# Patient Record
Sex: FEMALE | Race: WHITE | NOT HISPANIC OR LATINO | URBAN - METROPOLITAN AREA
[De-identification: names, ages, dates, MRNs, and addresses within clinical notes are randomized per-mention and may not be internally consistent; named-entity substitution may affect disease eponyms.]

---

## 2018-04-12 PROBLEM — Z00.00 ENCOUNTER FOR PREVENTIVE HEALTH EXAMINATION: Status: ACTIVE | Noted: 2018-04-12

## 2018-04-14 ENCOUNTER — INPATIENT (INPATIENT)
Facility: HOSPITAL | Age: 38
LOS: 3 days | Discharge: HOME | End: 2018-04-18
Attending: NEUROLOGICAL SURGERY | Admitting: NEUROLOGICAL SURGERY
Payer: COMMERCIAL

## 2018-04-14 VITALS
HEART RATE: 95 BPM | OXYGEN SATURATION: 100 % | DIASTOLIC BLOOD PRESSURE: 77 MMHG | TEMPERATURE: 98 F | SYSTOLIC BLOOD PRESSURE: 130 MMHG | RESPIRATION RATE: 16 BRPM

## 2018-04-14 DIAGNOSIS — M50.20 OTHER CERVICAL DISC DISPLACEMENT, UNSPECIFIED CERVICAL REGION: ICD-10-CM

## 2018-04-14 LAB
APTT BLD: 26.3 SEC — LOW (ref 27.5–37.4)
BLD GP AB SCN SERPL QL: NEGATIVE — SIGNIFICANT CHANGE UP
BUN SERPL-MCNC: 13 MG/DL — SIGNIFICANT CHANGE UP (ref 7–23)
CALCIUM SERPL-MCNC: 9.3 MG/DL — SIGNIFICANT CHANGE UP (ref 8.4–10.5)
CHLORIDE SERPL-SCNC: 100 MMOL/L — SIGNIFICANT CHANGE UP (ref 98–107)
CO2 SERPL-SCNC: 23 MMOL/L — SIGNIFICANT CHANGE UP (ref 22–31)
CREAT SERPL-MCNC: 0.63 MG/DL — SIGNIFICANT CHANGE UP (ref 0.5–1.3)
GLUCOSE SERPL-MCNC: 80 MG/DL — SIGNIFICANT CHANGE UP (ref 70–99)
HCG SERPL-ACNC: < 5 MIU/ML — SIGNIFICANT CHANGE UP
HCT VFR BLD CALC: 43.1 % — SIGNIFICANT CHANGE UP (ref 34.5–45)
HGB BLD-MCNC: 13.7 G/DL — SIGNIFICANT CHANGE UP (ref 11.5–15.5)
INR BLD: 0.99 — SIGNIFICANT CHANGE UP (ref 0.88–1.17)
MCHC RBC-ENTMCNC: 29.7 PG — SIGNIFICANT CHANGE UP (ref 27–34)
MCHC RBC-ENTMCNC: 31.8 % — LOW (ref 32–36)
MCV RBC AUTO: 93.5 FL — SIGNIFICANT CHANGE UP (ref 80–100)
NRBC # FLD: 0 — SIGNIFICANT CHANGE UP
PLATELET # BLD AUTO: 191 K/UL — SIGNIFICANT CHANGE UP (ref 150–400)
PMV BLD: 10.6 FL — SIGNIFICANT CHANGE UP (ref 7–13)
POTASSIUM SERPL-MCNC: 4 MMOL/L — SIGNIFICANT CHANGE UP (ref 3.5–5.3)
POTASSIUM SERPL-SCNC: 4 MMOL/L — SIGNIFICANT CHANGE UP (ref 3.5–5.3)
PROTHROM AB SERPL-ACNC: 11 SEC — SIGNIFICANT CHANGE UP (ref 9.8–13.1)
RBC # BLD: 4.61 M/UL — SIGNIFICANT CHANGE UP (ref 3.8–5.2)
RBC # FLD: 12.5 % — SIGNIFICANT CHANGE UP (ref 10.3–14.5)
RH IG SCN BLD-IMP: POSITIVE — SIGNIFICANT CHANGE UP
SODIUM SERPL-SCNC: 140 MMOL/L — SIGNIFICANT CHANGE UP (ref 135–145)
WBC # BLD: 6.35 K/UL — SIGNIFICANT CHANGE UP (ref 3.8–10.5)
WBC # FLD AUTO: 6.35 K/UL — SIGNIFICANT CHANGE UP (ref 3.8–10.5)

## 2018-04-14 PROCEDURE — 72141 MRI NECK SPINE W/O DYE: CPT | Mod: 26

## 2018-04-14 RX ORDER — DIAZEPAM 5 MG
2 TABLET ORAL EVERY 8 HOURS
Qty: 0 | Refills: 0 | Status: DISCONTINUED | OUTPATIENT
Start: 2018-04-14 | End: 2018-04-18

## 2018-04-14 RX ORDER — DEXAMETHASONE 0.5 MG/5ML
4 ELIXIR ORAL EVERY 6 HOURS
Qty: 0 | Refills: 0 | Status: DISCONTINUED | OUTPATIENT
Start: 2018-04-14 | End: 2018-04-18

## 2018-04-14 RX ORDER — LIDOCAINE 4 G/100G
1 CREAM TOPICAL ONCE
Qty: 0 | Refills: 0 | Status: COMPLETED | OUTPATIENT
Start: 2018-04-14 | End: 2018-04-14

## 2018-04-14 RX ORDER — ACETAMINOPHEN 500 MG
650 TABLET ORAL EVERY 6 HOURS
Qty: 0 | Refills: 0 | Status: DISCONTINUED | OUTPATIENT
Start: 2018-04-14 | End: 2018-04-18

## 2018-04-14 RX ORDER — KETOROLAC TROMETHAMINE 30 MG/ML
30 SYRINGE (ML) INJECTION ONCE
Qty: 0 | Refills: 0 | Status: DISCONTINUED | OUTPATIENT
Start: 2018-04-14 | End: 2018-04-14

## 2018-04-14 RX ORDER — ESCITALOPRAM OXALATE 10 MG/1
1 TABLET, FILM COATED ORAL
Qty: 0 | Refills: 0 | COMMUNITY

## 2018-04-14 RX ORDER — GABAPENTIN 400 MG/1
100 CAPSULE ORAL EVERY 8 HOURS
Qty: 0 | Refills: 0 | Status: DISCONTINUED | OUTPATIENT
Start: 2018-04-14 | End: 2018-04-18

## 2018-04-14 RX ORDER — ESCITALOPRAM OXALATE 10 MG/1
10 TABLET, FILM COATED ORAL DAILY
Qty: 0 | Refills: 0 | Status: DISCONTINUED | OUTPATIENT
Start: 2018-04-14 | End: 2018-04-18

## 2018-04-14 RX ADMIN — Medication 4 MILLIGRAM(S): at 16:34

## 2018-04-14 RX ADMIN — Medication 650 MILLIGRAM(S): at 19:04

## 2018-04-14 RX ADMIN — Medication 650 MILLIGRAM(S): at 19:36

## 2018-04-14 RX ADMIN — Medication 2 MILLIGRAM(S): at 23:12

## 2018-04-14 RX ADMIN — LIDOCAINE 1 PATCH: 4 CREAM TOPICAL at 09:55

## 2018-04-14 RX ADMIN — GABAPENTIN 100 MILLIGRAM(S): 400 CAPSULE ORAL at 23:12

## 2018-04-14 RX ADMIN — Medication 30 MILLIGRAM(S): at 09:55

## 2018-04-14 RX ADMIN — ESCITALOPRAM OXALATE 10 MILLIGRAM(S): 10 TABLET, FILM COATED ORAL at 16:34

## 2018-04-14 RX ADMIN — GABAPENTIN 100 MILLIGRAM(S): 400 CAPSULE ORAL at 16:34

## 2018-04-14 RX ADMIN — Medication 30 MILLIGRAM(S): at 10:25

## 2018-04-14 NOTE — H&P ADULT - NSHPPHYSICALEXAM_GEN_ALL_CORE
awake, alert Ox3 PERRL EOMI FC WONG x4   Biceps/triceps 4+/5,  slightly decreased 5-/5  No clonus No hoffmans

## 2018-04-14 NOTE — H&P ADULT - HISTORY OF PRESENT ILLNESS
38 yr old female with hx of irritability presented with L arm pain and weakness since Sunday. Per patient she had some numbness and tingling in the L hand and arm which progressed on Thursday to include weakness in her L hand and isolated to the 1st 3 digits on her hand. She does report it has been hard to  objects and that since Thursday the weakness has worsened. She had an appointment with Dr Caballero on Monday per her PMD but came to the hospital for burning pain in the L arm and worsened numbness.

## 2018-04-14 NOTE — ED PROVIDER NOTE - MEDICAL DECISION MAKING DETAILS
38 year old female with left sided back pain. Will give muscle relaxants, pain medication and attempt to get an MRI in the ER secondary to neurologic deficits.

## 2018-04-14 NOTE — ED PROVIDER NOTE - OBJECTIVE STATEMENT
38 year old female with a PMHx of asthma and chronic back pain, presents to the ED with left sided back pain x 5 days. Her pain started on Monday but worsened on Thursday. She admits to pain in the left chest that radiates to her left UE. The patient states that this pain is similar to her previous back pain experiences. She presents with weakness in her LUE with a severe electric sensation that she feels is an 8/10 in severity. She feels this sensation in digits 1-3. The patient denies trauma or injury, fever, chills, nausea, vomiting, HA, SOB, constipation, diarrhea, urinary incontinence, numbness, tingling, or any other complaint. The patient was seen as [facility] recently and was given muscle relaxants and pain medication with a recommendation to follow up with neurosurgery. She has a follow up appointment with neurosurgery on Monday, but her pain was too great which prompted her visit today.        muscle pain meds attempt to get MRI secondary to neurplogic deficits 38 year old female with a PMHx of asthma and chronic back pain, presents to the ED with left sided back pain x 5 days. Her pain started on Monday but worsened on Thursday. She admits to pain in the left chest that radiates to her left UE. The patient states that this pain is similar to her previous back pain experiences. She presents with weakness in her LUE with a severe electric sensation that she feels is an 8/10 in severity. She feels this sensation in digits 1-3. The patient denies trauma or injury, fever, chills, nausea, vomiting, HA, SOB, constipation, diarrhea, urinary incontinence, numbness, tingling, or any other complaint. The patient was seen as [facility] recently and was given muscle relaxants and pain medication with a recommendation to follow up with neurosurgery. She has a follow up appointment with neurosurgery on Monday, but her pain was too great which prompted her visit today.

## 2018-04-14 NOTE — H&P ADULT - PROBLEM SELECTOR PLAN 1
Admit to Neurosurgery  medical clearance   Decadron 4q6 IV   OOB, ambulation   Pre op labs   D/w Dr Caballero

## 2018-04-14 NOTE — ED PROVIDER NOTE - UPPER EXTREMITY EXAM, LEFT
REDUCED STRENGTH/weakness in left UE with  and flexion, pain with passive and active movements in left UE

## 2018-04-14 NOTE — H&P ADULT - NSHPLABSRESULTS_GEN_ALL_CORE
< from: MR Cervical Spine No Cont (04.14.18 @ 12:00) >    C6-7: There is evidence of a left-sided disc herniation identified. This   causes effacement of the ventral thecal sac and ventral spinal cord.   Hypertrophic facet joint changes are seen on the left side. Mild to   moderate narrowing of the left spinal canal and moderate narrowing of the   left neural foramen. Mild narrowing right neural foramen.    C7-T1: Normal.    Impression: Degenerative changes as described above.    Left-sided disc herniation is seen at the C6-7 level.    < end of copied text >

## 2018-04-15 ENCOUNTER — TRANSCRIPTION ENCOUNTER (OUTPATIENT)
Age: 38
End: 2018-04-15

## 2018-04-15 DIAGNOSIS — M48.02 SPINAL STENOSIS, CERVICAL REGION: ICD-10-CM

## 2018-04-15 PROCEDURE — 99232 SBSQ HOSP IP/OBS MODERATE 35: CPT

## 2018-04-15 RX ORDER — OXYCODONE HYDROCHLORIDE 5 MG/1
10 TABLET ORAL EVERY 4 HOURS
Qty: 0 | Refills: 0 | Status: DISCONTINUED | OUTPATIENT
Start: 2018-04-15 | End: 2018-04-16

## 2018-04-15 RX ORDER — OXYCODONE HYDROCHLORIDE 5 MG/1
5 TABLET ORAL EVERY 4 HOURS
Qty: 0 | Refills: 0 | Status: DISCONTINUED | OUTPATIENT
Start: 2018-04-15 | End: 2018-04-18

## 2018-04-15 RX ADMIN — Medication 4 MILLIGRAM(S): at 12:12

## 2018-04-15 RX ADMIN — GABAPENTIN 100 MILLIGRAM(S): 400 CAPSULE ORAL at 13:11

## 2018-04-15 RX ADMIN — Medication 4 MILLIGRAM(S): at 19:01

## 2018-04-15 RX ADMIN — GABAPENTIN 100 MILLIGRAM(S): 400 CAPSULE ORAL at 06:37

## 2018-04-15 RX ADMIN — OXYCODONE HYDROCHLORIDE 10 MILLIGRAM(S): 5 TABLET ORAL at 05:57

## 2018-04-15 RX ADMIN — GABAPENTIN 100 MILLIGRAM(S): 400 CAPSULE ORAL at 21:12

## 2018-04-15 RX ADMIN — OXYCODONE HYDROCHLORIDE 10 MILLIGRAM(S): 5 TABLET ORAL at 06:40

## 2018-04-15 RX ADMIN — ESCITALOPRAM OXALATE 10 MILLIGRAM(S): 10 TABLET, FILM COATED ORAL at 12:12

## 2018-04-15 RX ADMIN — OXYCODONE HYDROCHLORIDE 5 MILLIGRAM(S): 5 TABLET ORAL at 14:02

## 2018-04-15 RX ADMIN — OXYCODONE HYDROCHLORIDE 5 MILLIGRAM(S): 5 TABLET ORAL at 13:10

## 2018-04-15 RX ADMIN — Medication 4 MILLIGRAM(S): at 00:38

## 2018-04-15 RX ADMIN — Medication 2 MILLIGRAM(S): at 20:06

## 2018-04-15 RX ADMIN — Medication 4 MILLIGRAM(S): at 06:37

## 2018-04-16 ENCOUNTER — APPOINTMENT (OUTPATIENT)
Dept: NEUROSURGERY | Facility: CLINIC | Age: 38
End: 2018-04-16

## 2018-04-16 ENCOUNTER — RESULT REVIEW (OUTPATIENT)
Age: 38
End: 2018-04-16

## 2018-04-16 DIAGNOSIS — F41.9 ANXIETY DISORDER, UNSPECIFIED: ICD-10-CM

## 2018-04-16 DIAGNOSIS — J45.909 UNSPECIFIED ASTHMA, UNCOMPLICATED: ICD-10-CM

## 2018-04-16 LAB
BASE EXCESS BLDA CALC-SCNC: 0.1 MMOL/L — SIGNIFICANT CHANGE UP
BASOPHILS # BLD AUTO: 0.01 K/UL — SIGNIFICANT CHANGE UP (ref 0–0.2)
BASOPHILS NFR BLD AUTO: 0.1 % — SIGNIFICANT CHANGE UP (ref 0–2)
BUN SERPL-MCNC: 9 MG/DL — SIGNIFICANT CHANGE UP (ref 7–23)
CA-I BLDA-SCNC: 1.17 MMOL/L — SIGNIFICANT CHANGE UP (ref 1.15–1.29)
CALCIUM SERPL-MCNC: 8.6 MG/DL — SIGNIFICANT CHANGE UP (ref 8.4–10.5)
CHLORIDE SERPL-SCNC: 102 MMOL/L — SIGNIFICANT CHANGE UP (ref 98–107)
CO2 SERPL-SCNC: 23 MMOL/L — SIGNIFICANT CHANGE UP (ref 22–31)
CREAT SERPL-MCNC: 0.57 MG/DL — SIGNIFICANT CHANGE UP (ref 0.5–1.3)
EOSINOPHIL # BLD AUTO: 0 K/UL — SIGNIFICANT CHANGE UP (ref 0–0.5)
EOSINOPHIL NFR BLD AUTO: 0 % — SIGNIFICANT CHANGE UP (ref 0–6)
GLUCOSE BLDA-MCNC: 117 MG/DL — HIGH (ref 70–99)
GLUCOSE SERPL-MCNC: 128 MG/DL — HIGH (ref 70–99)
HCO3 BLDA-SCNC: 25 MMOL/L — SIGNIFICANT CHANGE UP (ref 22–26)
HCT VFR BLD CALC: 38.6 % — SIGNIFICANT CHANGE UP (ref 34.5–45)
HCT VFR BLDA CALC: 38.8 % — SIGNIFICANT CHANGE UP (ref 34.5–46.5)
HGB BLD-MCNC: 12.5 G/DL — SIGNIFICANT CHANGE UP (ref 11.5–15.5)
HGB BLDA-MCNC: 12.6 G/DL — SIGNIFICANT CHANGE UP (ref 11.5–15.5)
IMM GRANULOCYTES # BLD AUTO: 0.05 # — SIGNIFICANT CHANGE UP
IMM GRANULOCYTES NFR BLD AUTO: 0.4 % — SIGNIFICANT CHANGE UP (ref 0–1.5)
LYMPHOCYTES # BLD AUTO: 0.4 K/UL — LOW (ref 1–3.3)
LYMPHOCYTES # BLD AUTO: 3.3 % — LOW (ref 13–44)
MCHC RBC-ENTMCNC: 30 PG — SIGNIFICANT CHANGE UP (ref 27–34)
MCHC RBC-ENTMCNC: 32.4 % — SIGNIFICANT CHANGE UP (ref 32–36)
MCV RBC AUTO: 92.6 FL — SIGNIFICANT CHANGE UP (ref 80–100)
MONOCYTES # BLD AUTO: 0.59 K/UL — SIGNIFICANT CHANGE UP (ref 0–0.9)
MONOCYTES NFR BLD AUTO: 4.9 % — SIGNIFICANT CHANGE UP (ref 2–14)
NEUTROPHILS # BLD AUTO: 10.99 K/UL — HIGH (ref 1.8–7.4)
NEUTROPHILS NFR BLD AUTO: 91.3 % — HIGH (ref 43–77)
NRBC # FLD: 0 — SIGNIFICANT CHANGE UP
PCO2 BLDA: 34 MMHG — SIGNIFICANT CHANGE UP (ref 32–48)
PH BLDA: 7.46 PH — HIGH (ref 7.35–7.45)
PLATELET # BLD AUTO: 204 K/UL — SIGNIFICANT CHANGE UP (ref 150–400)
PMV BLD: 10.2 FL — SIGNIFICANT CHANGE UP (ref 7–13)
PO2 BLDA: 218 MMHG — HIGH (ref 83–108)
POTASSIUM BLDA-SCNC: 3.6 MMOL/L — SIGNIFICANT CHANGE UP (ref 3.4–4.5)
POTASSIUM SERPL-MCNC: 4 MMOL/L — SIGNIFICANT CHANGE UP (ref 3.5–5.3)
POTASSIUM SERPL-SCNC: 4 MMOL/L — SIGNIFICANT CHANGE UP (ref 3.5–5.3)
RBC # BLD: 4.17 M/UL — SIGNIFICANT CHANGE UP (ref 3.8–5.2)
RBC # FLD: 12.7 % — SIGNIFICANT CHANGE UP (ref 10.3–14.5)
RH IG SCN BLD-IMP: POSITIVE — SIGNIFICANT CHANGE UP
SAO2 % BLDA: 99.5 % — HIGH (ref 95–99)
SODIUM BLDA-SCNC: 137 MMOL/L — SIGNIFICANT CHANGE UP (ref 136–146)
SODIUM SERPL-SCNC: 138 MMOL/L — SIGNIFICANT CHANGE UP (ref 135–145)
WBC # BLD: 12.04 K/UL — HIGH (ref 3.8–10.5)
WBC # FLD AUTO: 12.04 K/UL — HIGH (ref 3.8–10.5)

## 2018-04-16 PROCEDURE — 99223 1ST HOSP IP/OBS HIGH 75: CPT

## 2018-04-16 PROCEDURE — 88304 TISSUE EXAM BY PATHOLOGIST: CPT | Mod: 26

## 2018-04-16 PROCEDURE — 72125 CT NECK SPINE W/O DYE: CPT | Mod: 26

## 2018-04-16 PROCEDURE — 22853 INSJ BIOMECHANICAL DEVICE: CPT

## 2018-04-16 PROCEDURE — 22551 ARTHRD ANT NTRBDY CERVICAL: CPT

## 2018-04-16 RX ORDER — ALBUTEROL 90 UG/1
2 AEROSOL, METERED ORAL EVERY 6 HOURS
Qty: 0 | Refills: 0 | Status: DISCONTINUED | OUTPATIENT
Start: 2018-04-16 | End: 2018-04-18

## 2018-04-16 RX ORDER — DOCUSATE SODIUM 100 MG
100 CAPSULE ORAL THREE TIMES A DAY
Qty: 0 | Refills: 0 | Status: DISCONTINUED | OUTPATIENT
Start: 2018-04-16 | End: 2018-04-18

## 2018-04-16 RX ORDER — FENTANYL CITRATE 50 UG/ML
25 INJECTION INTRAVENOUS
Qty: 0 | Refills: 0 | Status: DISCONTINUED | OUTPATIENT
Start: 2018-04-16 | End: 2018-04-17

## 2018-04-16 RX ORDER — SODIUM CHLORIDE 9 MG/ML
1000 INJECTION INTRAMUSCULAR; INTRAVENOUS; SUBCUTANEOUS
Qty: 0 | Refills: 0 | Status: DISCONTINUED | OUTPATIENT
Start: 2018-04-16 | End: 2018-04-17

## 2018-04-16 RX ORDER — LACTOBACILLUS ACIDOPHILUS 100MM CELL
1 CAPSULE ORAL DAILY
Qty: 0 | Refills: 0 | Status: DISCONTINUED | OUTPATIENT
Start: 2018-04-16 | End: 2018-04-18

## 2018-04-16 RX ORDER — CEFAZOLIN SODIUM 1 G
1000 VIAL (EA) INJECTION EVERY 8 HOURS
Qty: 0 | Refills: 0 | Status: COMPLETED | OUTPATIENT
Start: 2018-04-16 | End: 2018-04-16

## 2018-04-16 RX ORDER — SENNA PLUS 8.6 MG/1
2 TABLET ORAL AT BEDTIME
Qty: 0 | Refills: 0 | Status: DISCONTINUED | OUTPATIENT
Start: 2018-04-16 | End: 2018-04-18

## 2018-04-16 RX ORDER — ONDANSETRON 8 MG/1
4 TABLET, FILM COATED ORAL ONCE
Qty: 0 | Refills: 0 | Status: DISCONTINUED | OUTPATIENT
Start: 2018-04-16 | End: 2018-04-17

## 2018-04-16 RX ORDER — HYDROMORPHONE HYDROCHLORIDE 2 MG/ML
0.5 INJECTION INTRAMUSCULAR; INTRAVENOUS; SUBCUTANEOUS
Qty: 0 | Refills: 0 | Status: DISCONTINUED | OUTPATIENT
Start: 2018-04-16 | End: 2018-04-17

## 2018-04-16 RX ORDER — PANTOPRAZOLE SODIUM 20 MG/1
40 TABLET, DELAYED RELEASE ORAL
Qty: 0 | Refills: 0 | Status: DISCONTINUED | OUTPATIENT
Start: 2018-04-16 | End: 2018-04-18

## 2018-04-16 RX ORDER — HYDROMORPHONE HYDROCHLORIDE 2 MG/ML
1 INJECTION INTRAMUSCULAR; INTRAVENOUS; SUBCUTANEOUS EVERY 4 HOURS
Qty: 0 | Refills: 0 | Status: DISCONTINUED | OUTPATIENT
Start: 2018-04-16 | End: 2018-04-18

## 2018-04-16 RX ADMIN — SODIUM CHLORIDE 75 MILLILITER(S): 9 INJECTION INTRAMUSCULAR; INTRAVENOUS; SUBCUTANEOUS at 17:15

## 2018-04-16 RX ADMIN — HYDROMORPHONE HYDROCHLORIDE 0.5 MILLIGRAM(S): 2 INJECTION INTRAMUSCULAR; INTRAVENOUS; SUBCUTANEOUS at 22:32

## 2018-04-16 RX ADMIN — GABAPENTIN 100 MILLIGRAM(S): 400 CAPSULE ORAL at 21:35

## 2018-04-16 RX ADMIN — PANTOPRAZOLE SODIUM 40 MILLIGRAM(S): 20 TABLET, DELAYED RELEASE ORAL at 07:40

## 2018-04-16 RX ADMIN — Medication 4 MILLIGRAM(S): at 19:58

## 2018-04-16 RX ADMIN — OXYCODONE HYDROCHLORIDE 10 MILLIGRAM(S): 5 TABLET ORAL at 05:52

## 2018-04-16 RX ADMIN — SENNA PLUS 2 TABLET(S): 8.6 TABLET ORAL at 22:32

## 2018-04-16 RX ADMIN — ESCITALOPRAM OXALATE 10 MILLIGRAM(S): 10 TABLET, FILM COATED ORAL at 21:35

## 2018-04-16 RX ADMIN — HYDROMORPHONE HYDROCHLORIDE 1 MILLIGRAM(S): 2 INJECTION INTRAMUSCULAR; INTRAVENOUS; SUBCUTANEOUS at 18:05

## 2018-04-16 RX ADMIN — Medication 100 MILLIGRAM(S): at 21:33

## 2018-04-16 RX ADMIN — HYDROMORPHONE HYDROCHLORIDE 0.5 MILLIGRAM(S): 2 INJECTION INTRAMUSCULAR; INTRAVENOUS; SUBCUTANEOUS at 22:44

## 2018-04-16 RX ADMIN — OXYCODONE HYDROCHLORIDE 10 MILLIGRAM(S): 5 TABLET ORAL at 06:30

## 2018-04-16 RX ADMIN — GABAPENTIN 100 MILLIGRAM(S): 400 CAPSULE ORAL at 05:53

## 2018-04-16 RX ADMIN — Medication 4 MILLIGRAM(S): at 05:53

## 2018-04-16 RX ADMIN — HYDROMORPHONE HYDROCHLORIDE 1 MILLIGRAM(S): 2 INJECTION INTRAMUSCULAR; INTRAVENOUS; SUBCUTANEOUS at 17:48

## 2018-04-16 RX ADMIN — Medication 4 MILLIGRAM(S): at 00:25

## 2018-04-16 NOTE — CONSULT NOTE ADULT - PROBLEM SELECTOR RECOMMENDATION 9
as per NSx, plan for ACDF today   on Decadron   pt is low risk for this procedure, no further medical/cardiac workup needed

## 2018-04-16 NOTE — CONSULT NOTE ADULT - ASSESSMENT
37 yo F w/ "irritability", mild intermittent asthma p/w LUE weakness, fth C6-C7 disk herniation, plan for OR for ACDF today

## 2018-04-16 NOTE — CONSULT NOTE ADULT - SUBJECTIVE AND OBJECTIVE BOX
CHIEF COMPLAINT: Patient is a 38y old  Female who presents with a chief complaint of neck pain (14 Apr 2018 15:06)      HPI: 38 yr old female with hx of irritability presented with L arm pain and weakness since Sunday. Per patient she had some numbness and tingling in the L hand and arm which progressed on Thursday to include weakness in her L hand and isolated to the 1st 3 digits on her hand. She does report it has been hard to  objects and that since Thursday the weakness has worsened. She had an appointment with Dr Caballero on Monday per her PMD but came to the hospital for burning pain in the L arm and worsened numbness. (14 Apr 2018 15:06)      History limited due to: [ ] Dementia  [ ] Delirium  [ ] Condition    Pain Symptoms if applicable:             	                         none	   mild         moderate         severe  	                            0	    1-3	     4-6	         7-10  Pain:  Location:	  Modifying factors:	  Associated symptoms:	    Allergies    No Known Allergies    Intolerances        HOME MEDICATIONS: [x] Reviewed    MEDICATIONS  (STANDING):  dexamethasone  Injectable 4 milliGRAM(s) IV Push every 6 hours  docusate sodium 100 milliGRAM(s) Oral three times a day  escitalopram 10 milliGRAM(s) Oral daily  gabapentin 100 milliGRAM(s) Oral every 8 hours  pantoprazole    Tablet 40 milliGRAM(s) Oral before breakfast  senna 2 Tablet(s) Oral at bedtime    MEDICATIONS  (PRN):  acetaminophen   Tablet. 650 milliGRAM(s) Oral every 6 hours PRN Mild Pain (1 - 3)  diazepam    Tablet 2 milliGRAM(s) Oral every 8 hours PRN spasm  oxyCODONE    IR 5 milliGRAM(s) Oral every 4 hours PRN Moderate Pain (4 - 6)  oxyCODONE    IR 10 milliGRAM(s) Oral every 4 hours PRN Severe Pain (7 - 10)      PAST MEDICAL & SURGICAL HISTORY:  Chronic back pain  Asthma  No significant past surgical history  [x ] Reviewed     SOCIAL HISTORY:  [x] Substance abuse:   [x] Alcohol use:   [x] Tobacco:     FAMILY HISTORY:  [x] No pertinent family history in first degree relatives     REVIEW OF SYSTEMS:  [x] All other ROS negative  [  ] Unable to obtain due to poor mental status    Vital Signs Last 24 Hrs  T(C): 36.9 (16 Apr 2018 09:50), Max: 37.1 (15 Apr 2018 17:32)  T(F): 98.5 (16 Apr 2018 09:50), Max: 98.8 (15 Apr 2018 17:32)  HR: 87 (16 Apr 2018 09:50) (83 - 98)  BP: 139/83 (16 Apr 2018 09:50) (96/55 - 139/83)  BP(mean): --  RR: 18 (16 Apr 2018 09:50) (16 - 18)  SpO2: 95% (16 Apr 2018 09:50) (95% - 99%)    PHYSICAL EXAM:  GENERAL: NAD, well-groomed, well-developed  HEAD:  Atraumatic, Normocephalic  EYES: EOMI, PERRLA, conjunctiva and sclera clear  ENMT: Moist mucous membranes  NECK: Supple, No JVD  RESPIRATORY: Clear to auscultation bilaterally; No rales, rhonchi, wheezing, or rubs  CARDIOVASCULAR: Regular rate and rhythm; No murmurs, rubs, or gallops  GASTROINTESTINAL: Soft, Nontender, Nondistended; Bowel sounds present  GENITOURINARY: Not examined  EXTREMITIES:  2+ Peripheral Pulses, No clubbing, cyanosis, or edema  NERVOUS SYSTEM:  Alert & Oriented X3; Moving all 4 extremities; No gross sensory deficits  HEME/LYMPH: No lymphadenopathy noted  SKIN: No rashes or lesions; Incisions C/D/I    LABS:                        13.7   6.35  )-----------( 191      ( 14 Apr 2018 16:22 )             43.1     Hemoglobin: 13.7 g/dL (04-14 @ 16:22)    04-14    140  |  100  |  13  ----------------------------<  80  4.0   |  23  |  0.63    Ca    9.3      14 Apr 2018 16:22      PT/INR - ( 14 Apr 2018 16:22 )   PT: 11.0 SEC;   INR: 0.99          PTT - ( 14 Apr 2018 16:22 )  PTT:26.3 SEC    Microbiology     RADIOLOGY & ADDITIONAL STUDIES:    EKG:   Personally Reviewed:  [x] YES     Imaging:   Personally Reviewed:  [x] YES               [ ] Consultant(s) Notes Reviewed  [x] Care Discussed with Consultants/Other Providers: CHIEF COMPLAINT: Patient is a 38y old  Female who presents with a chief complaint of neck pain (14 Apr 2018 15:06)      HPI: 38 yr old female with hx of irritability presented with L arm pain and weakness since Sunday. Per patient she had some numbness and tingling in the L hand and arm which progressed on Thursday to include weakness in her L hand and isolated to the 1st 3 digits on her hand. She does report it has been hard to  objects and that since Thursday the weakness has worsened. She had an appointment with Dr Caballero on Monday per her PMD but came to the hospital for burning pain in the L arm and worsened numbness. (14 Apr 2018 15:06)    Interval hx: Pt seen at bedside ~ 10 am. She just met with Dr. Caballero and is pending surgery at 12:30PM. Pt reports that she is on Lexapro for "irritability." She also has hx of what asthma, mild intermittent, for which she uses albuterol inhaler infrequently, < 1x / month.   She denies any current cp, sob, palpitations, nausea, vomiting, abdominal pain, diarrhea, or constipation.       Allergies    No Known Allergies    Intolerances        HOME MEDICATIONS: [x] Reviewed    MEDICATIONS  (STANDING):  dexamethasone  Injectable 4 milliGRAM(s) IV Push every 6 hours  docusate sodium 100 milliGRAM(s) Oral three times a day  escitalopram 10 milliGRAM(s) Oral daily  gabapentin 100 milliGRAM(s) Oral every 8 hours  pantoprazole    Tablet 40 milliGRAM(s) Oral before breakfast  senna 2 Tablet(s) Oral at bedtime    MEDICATIONS  (PRN):  acetaminophen   Tablet. 650 milliGRAM(s) Oral every 6 hours PRN Mild Pain (1 - 3)  diazepam    Tablet 2 milliGRAM(s) Oral every 8 hours PRN spasm  oxyCODONE    IR 5 milliGRAM(s) Oral every 4 hours PRN Moderate Pain (4 - 6)  oxyCODONE    IR 10 milliGRAM(s) Oral every 4 hours PRN Severe Pain (7 - 10)      PAST MEDICAL & SURGICAL HISTORY:  Chronic back pain  Asthma  No significant past surgical history  [x ] Reviewed     SOCIAL HISTORY:   w/ kids   [x] Substance abuse: no   [x] Alcohol use: social   [x] Tobacco: remote    FAMILY HISTORY:  [x] No pertinent family history in first degree relatives     REVIEW OF SYSTEMS:  [x] All other ROS negative  [  ] Unable to obtain due to poor mental status    Vital Signs Last 24 Hrs  T(C): 36.9 (16 Apr 2018 09:50), Max: 37.1 (15 Apr 2018 17:32)  T(F): 98.5 (16 Apr 2018 09:50), Max: 98.8 (15 Apr 2018 17:32)  HR: 87 (16 Apr 2018 09:50) (83 - 98)  BP: 139/83 (16 Apr 2018 09:50) (96/55 - 139/83)  BP(mean): --  RR: 18 (16 Apr 2018 09:50) (16 - 18)  SpO2: 95% (16 Apr 2018 09:50) (95% - 99%)    PHYSICAL EXAM:  GENERAL: NAD, well-groomed, well-developed  HEAD:  Atraumatic, Normocephalic  EYES: EOMI, PERRLA, conjunctiva and sclera clear  ENMT: Moist mucous membranes  NECK: Supple, No JVD  RESPIRATORY: Clear to auscultation bilaterally; No rales, rhonchi, wheezing, or rubs  CARDIOVASCULAR: Regular rate and rhythm; No murmurs, rubs, or gallops  GASTROINTESTINAL: Soft, Nontender, Nondistended; Bowel sounds present  EXTREMITIES:  2+ Peripheral Pulses, No clubbing, cyanosis, or edema  NERVOUS SYSTEM:  Alert & Oriented X3; Moving all 4 extremities; No gross sensory deficits      LABS:                        13.7   6.35  )-----------( 191      ( 14 Apr 2018 16:22 )             43.1     Hemoglobin: 13.7 g/dL (04-14 @ 16:22)    04-14    140  |  100  |  13  ----------------------------<  80  4.0   |  23  |  0.63    Ca    9.3      14 Apr 2018 16:22      PT/INR - ( 14 Apr 2018 16:22 )   PT: 11.0 SEC;   INR: 0.99          PTT - ( 14 Apr 2018 16:22 )  PTT:26.3 SEC    Microbiology     RADIOLOGY & ADDITIONAL STUDIES:    EKG:   Personally Reviewed:  [x] YES   NSR @ 95 bpm     Imaging:   Personally Reviewed:  [x] YES   < from: MR Cervical Spine No Cont (04.14.18 @ 12:00) >    EXAM:  MR SPINE CERVICAL        PROCEDURE DATE:  Apr 14 2018         INTERPRETATION:  History: Neck pain.    MRI of the cervical spine was performed using sagittal T1-T2 and STIR   sequence. Axial T2 and 2-D merge sequences were performed.    Mild loss of normal cervical lordosis is seen.    Mild scoliosis is seen.    The vertebral body height alignment and marrow signal appear normal    Disc desiccation seen throughout the cervical spine region which is   secondary to degenerative changes been    C2-3: Normal    C3-4: Hypertrophic facet joint changes are seen on the right side which   causes mild to moderate narrowing of the right neural foramen.     C4-5: Hypertrophic facet joint changes are seen on the right side which   causes moderate narrowing of the right neural foramen.    C5-6: Hypertrophic change is seen on the right side as well as   hypertrophic changes involving the right uncovertebral joint. Moderate   narrowing of the right neural foramen is seen. Mild narrowing of the left   neural foramen is seen.    C6-7: There is evidence of a left-sided disc herniation identified. This   causes effacement of the ventral thecal sac and ventral spinal cord.   Hypertrophic facet joint changes are seen on the left side. Mild to   moderate narrowing of the left spinal canal and moderate narrowing of the   left neural foramen. Mild narrowing right neural foramen.    C7-T1: Normal.    Impression: Degenerative changes as described above.    Left-sided disc herniation is seen at the C6-7 level.                    ELIZABETH CORDON M.D., ATTENDING RADIOLOGIST  This document has been electronically signed. Apr 14 2018 12:13PM              < end of copied text >                [ ] Consultant(s) Notes Reviewed  [x] Care Discussed with Consultants/Other Providers: Gretta GRIJALVA re med optimization, plan for OR today

## 2018-04-17 PROCEDURE — 99233 SBSQ HOSP IP/OBS HIGH 50: CPT

## 2018-04-17 RX ADMIN — ESCITALOPRAM OXALATE 10 MILLIGRAM(S): 10 TABLET, FILM COATED ORAL at 13:33

## 2018-04-17 RX ADMIN — HYDROMORPHONE HYDROCHLORIDE 1 MILLIGRAM(S): 2 INJECTION INTRAMUSCULAR; INTRAVENOUS; SUBCUTANEOUS at 06:45

## 2018-04-17 RX ADMIN — HYDROMORPHONE HYDROCHLORIDE 1 MILLIGRAM(S): 2 INJECTION INTRAMUSCULAR; INTRAVENOUS; SUBCUTANEOUS at 07:15

## 2018-04-17 RX ADMIN — HYDROMORPHONE HYDROCHLORIDE 1 MILLIGRAM(S): 2 INJECTION INTRAMUSCULAR; INTRAVENOUS; SUBCUTANEOUS at 22:20

## 2018-04-17 RX ADMIN — Medication 100 MILLIGRAM(S): at 22:02

## 2018-04-17 RX ADMIN — HYDROMORPHONE HYDROCHLORIDE 1 MILLIGRAM(S): 2 INJECTION INTRAMUSCULAR; INTRAVENOUS; SUBCUTANEOUS at 02:25

## 2018-04-17 RX ADMIN — GABAPENTIN 100 MILLIGRAM(S): 400 CAPSULE ORAL at 06:45

## 2018-04-17 RX ADMIN — SENNA PLUS 2 TABLET(S): 8.6 TABLET ORAL at 22:02

## 2018-04-17 RX ADMIN — Medication 100 MILLIGRAM(S): at 07:36

## 2018-04-17 RX ADMIN — GABAPENTIN 100 MILLIGRAM(S): 400 CAPSULE ORAL at 13:33

## 2018-04-17 RX ADMIN — HYDROMORPHONE HYDROCHLORIDE 1 MILLIGRAM(S): 2 INJECTION INTRAMUSCULAR; INTRAVENOUS; SUBCUTANEOUS at 17:57

## 2018-04-17 RX ADMIN — Medication 100 MILLIGRAM(S): at 13:33

## 2018-04-17 RX ADMIN — HYDROMORPHONE HYDROCHLORIDE 1 MILLIGRAM(S): 2 INJECTION INTRAMUSCULAR; INTRAVENOUS; SUBCUTANEOUS at 13:47

## 2018-04-17 RX ADMIN — HYDROMORPHONE HYDROCHLORIDE 1 MILLIGRAM(S): 2 INJECTION INTRAMUSCULAR; INTRAVENOUS; SUBCUTANEOUS at 17:33

## 2018-04-17 RX ADMIN — GABAPENTIN 100 MILLIGRAM(S): 400 CAPSULE ORAL at 22:02

## 2018-04-17 RX ADMIN — HYDROMORPHONE HYDROCHLORIDE 1 MILLIGRAM(S): 2 INJECTION INTRAMUSCULAR; INTRAVENOUS; SUBCUTANEOUS at 22:02

## 2018-04-17 RX ADMIN — Medication 4 MILLIGRAM(S): at 07:36

## 2018-04-17 RX ADMIN — PANTOPRAZOLE SODIUM 40 MILLIGRAM(S): 20 TABLET, DELAYED RELEASE ORAL at 06:45

## 2018-04-17 RX ADMIN — Medication 1 TABLET(S): at 13:32

## 2018-04-17 RX ADMIN — HYDROMORPHONE HYDROCHLORIDE 1 MILLIGRAM(S): 2 INJECTION INTRAMUSCULAR; INTRAVENOUS; SUBCUTANEOUS at 13:32

## 2018-04-17 RX ADMIN — Medication 4 MILLIGRAM(S): at 19:02

## 2018-04-17 RX ADMIN — Medication 4 MILLIGRAM(S): at 02:24

## 2018-04-17 RX ADMIN — Medication 4 MILLIGRAM(S): at 13:33

## 2018-04-17 RX ADMIN — HYDROMORPHONE HYDROCHLORIDE 1 MILLIGRAM(S): 2 INJECTION INTRAMUSCULAR; INTRAVENOUS; SUBCUTANEOUS at 02:59

## 2018-04-17 NOTE — PHYSICAL THERAPY INITIAL EVALUATION ADULT - GENERAL OBSERVATIONS, REHAB EVAL
Consult received, chart reviewed. Patient received supine in bed, NAD, +harrison, +drain. Patient agreed to EVALUATION from Physical Therapist.

## 2018-04-17 NOTE — PHYSICAL THERAPY INITIAL EVALUATION ADULT - DISCHARGE DISPOSITION, PT EVAL
Home with Outpatient PT; Pt. presents without gross impairment and independent with functional mobility. Skilled, therapeutic PT intervention not indicated at this time. Pt. will be d/c from PT program at this time.

## 2018-04-17 NOTE — PHYSICAL THERAPY INITIAL EVALUATION ADULT - PERTINENT HX OF CURRENT PROBLEM, REHAB EVAL
Pt. presents with hx of irritability presented with L arm pain and weakness since Sunday Pt. presents with hx of irritability presented with L arm pain and weakness since Sunday, (+) CT cervical spine Left side disc herniation C6-7

## 2018-04-17 NOTE — OCCUPATIONAL THERAPY INITIAL EVALUATION ADULT - PERTINENT HX OF CURRENT PROBLEM, REHAB EVAL
38 yr old female with hx of irritability presented with L arm pain and weakn. Per patient she had some numbness and tingling in the L hand and arm which progressed to include weakness in her Left hand and isolated to the 1st 3 digits on her hand. She does report it has been hard to  objects. Pt now S/P C6-7 ACDF.

## 2018-04-17 NOTE — PHYSICAL THERAPY INITIAL EVALUATION ADULT - RANGE OF MOTION EXAMINATION, REHAB EVAL
bilateral upper extremity ROM was WFL (within functional limits)/bilateral lower extremity ROM was WFL (within functional limits)/UE kept ~90 degrees

## 2018-04-18 ENCOUNTER — TRANSCRIPTION ENCOUNTER (OUTPATIENT)
Age: 38
End: 2018-04-18

## 2018-04-18 VITALS — TEMPERATURE: 99 F

## 2018-04-18 PROBLEM — J45.909 UNSPECIFIED ASTHMA, UNCOMPLICATED: Chronic | Status: ACTIVE | Noted: 2018-04-14

## 2018-04-18 PROBLEM — M54.9 DORSALGIA, UNSPECIFIED: Chronic | Status: ACTIVE | Noted: 2018-04-14

## 2018-04-18 RX ORDER — DIAZEPAM 5 MG
1 TABLET ORAL
Qty: 15 | Refills: 0 | OUTPATIENT
Start: 2018-04-18 | End: 2018-04-22

## 2018-04-18 RX ORDER — OXYCODONE HYDROCHLORIDE 5 MG/1
1 TABLET ORAL
Qty: 42 | Refills: 0 | OUTPATIENT
Start: 2018-04-18 | End: 2018-04-24

## 2018-04-18 RX ORDER — SENNA PLUS 8.6 MG/1
2 TABLET ORAL
Qty: 0 | Refills: 0 | COMMUNITY
Start: 2018-04-18

## 2018-04-18 RX ORDER — GABAPENTIN 400 MG/1
1 CAPSULE ORAL
Qty: 42 | Refills: 0 | OUTPATIENT
Start: 2018-04-18 | End: 2018-05-01

## 2018-04-18 RX ORDER — DOCUSATE SODIUM 100 MG
1 CAPSULE ORAL
Qty: 0 | Refills: 0 | COMMUNITY
Start: 2018-04-18

## 2018-04-18 RX ORDER — ACETAMINOPHEN 500 MG
2 TABLET ORAL
Qty: 0 | Refills: 0 | COMMUNITY
Start: 2018-04-18

## 2018-04-18 RX ADMIN — HYDROMORPHONE HYDROCHLORIDE 1 MILLIGRAM(S): 2 INJECTION INTRAMUSCULAR; INTRAVENOUS; SUBCUTANEOUS at 03:37

## 2018-04-18 RX ADMIN — HYDROMORPHONE HYDROCHLORIDE 1 MILLIGRAM(S): 2 INJECTION INTRAMUSCULAR; INTRAVENOUS; SUBCUTANEOUS at 03:19

## 2018-04-18 RX ADMIN — GABAPENTIN 100 MILLIGRAM(S): 400 CAPSULE ORAL at 05:48

## 2018-04-18 RX ADMIN — Medication 4 MILLIGRAM(S): at 01:27

## 2018-04-18 RX ADMIN — Medication 100 MILLIGRAM(S): at 05:48

## 2018-04-18 RX ADMIN — Medication 4 MILLIGRAM(S): at 07:37

## 2018-04-18 RX ADMIN — PANTOPRAZOLE SODIUM 40 MILLIGRAM(S): 20 TABLET, DELAYED RELEASE ORAL at 07:37

## 2018-04-18 NOTE — DISCHARGE NOTE ADULT - MEDICATION SUMMARY - MEDICATIONS TO TAKE
I will START or STAY ON the medications listed below when I get home from the hospital:    acetaminophen 325 mg oral tablet  -- 2 tab(s) by mouth every 6 hours, As needed, Mild Pain (1 - 3)  -- Indication: For prn mild pain    oxyCODONE 5 mg oral tablet  -- 1 tab(s) by mouth every 4 hours, As needed, Moderate Pain (4 - 6) MDD:6 tabs  -- Indication: For prn moderate pain    diazePAM 2 mg oral tablet  -- 1 tab(s) by mouth every 8 hours, As needed, spasm MDD:3 tabs  -- Indication: For prn muscle spasm    gabapentin 100 mg oral capsule  -- 1 cap(s) by mouth every 8 hours MDD:3 tabs  -- Indication: For Neuropathic pain    Lexapro 10 mg oral tablet  -- 1 tab(s) by mouth once a day  -- Indication: For Home med    docusate sodium 100 mg oral capsule  -- 1 cap(s) by mouth 3 times a day  -- Indication: For bowel regimen    senna oral tablet  -- 2 tab(s) by mouth once a day (at bedtime)  -- Indication: For bowel regimen

## 2018-04-18 NOTE — DISCHARGE NOTE ADULT - CARE PROVIDER_API CALL
Cortney Caballero), [facility] Neurosurgery  General  [address] [address]  Phone: [phone]  Fax: [phone]

## 2018-04-18 NOTE — PROGRESS NOTE ADULT - SUBJECTIVE AND OBJECTIVE BOX
< from: MR Cervical Spine No Cont (04.14.18 @ 12:00) >  MRI of the cervical spine was performed using sagittal T1-T2 and STIR   sequence. Axial T2 and 2-D merge sequences were performed.    Mild loss of normal cervical lordosis is seen.    Mild scoliosis is seen.    The vertebral body height alignment and marrow signal appear normal    Disc desiccation seen throughout the cervical spine region which is   secondary to degenerative changes been    C2-3: Normal    C3-4: Hypertrophic facet joint changes are seen on the right side which   causes mild to moderate narrowing of the right neural foramen.     C4-5: Hypertrophic facet joint changes are seen on the right side which   causes moderate narrowing of the right neural foramen.    C5-6: Hypertrophic change is seen on the right side as well as   hypertrophic changes involving the right uncovertebral joint. Moderate   narrowing of the right neural foramen is seen. Mild narrowing of the left   neural foramen is seen.    C6-7: There is evidence of a left-sided disc herniation identified. This   causes effacement of the ventral thecal sac and ventral spinal cord.   Hypertrophic facet joint changes are seen on the left side. Mild to   moderate narrowing of the left spinal canal and moderate narrowing of the   left neural foramen. Mild narrowing right neural foramen.    C7-T1: Normal.    Impression: Degenerative changes as described above.    Left-sided disc herniation is seen at the C6-7 level.    < end of copied text >  No issues overnight  Vital Signs Last 24 Hrs  T(C): 36.7 (15 Apr 2018 00:58), Max: 37.2 (14 Apr 2018 18:50)  T(F): 98.1 (15 Apr 2018 00:58), Max: 98.9 (14 Apr 2018 18:50)  HR: 64 (15 Apr 2018 00:58) (61 - 95)  BP: 101/62 (15 Apr 2018 00:58) (101/62 - 130/77)  BP(mean): --  RR: 16 (15 Apr 2018 00:58) (16 - 18)  SpO2: 97% (15 Apr 2018 00:58) (97% - 100%)     AAOx3  PERRLA, EOMI    WONG x4   	Biceps/triceps 4+/5,  slightly decreased 5-/5  No clonus No hoffmans    MEDICATIONS  (STANDING):  dexamethasone  Injectable 4 milliGRAM(s) IV Push every 6 hours  escitalopram 10 milliGRAM(s) Oral daily  gabapentin 100 milliGRAM(s) Oral every 8 hours    MEDICATIONS  (PRN):  acetaminophen   Tablet. 650 milliGRAM(s) Oral every 6 hours PRN Mild Pain (1 - 3)  diazepam    Tablet 2 milliGRAM(s) Oral every 8 hours PRN spasm                          13.7   6.35  )-----------( 191      ( 14 Apr 2018 16:22 )             43.1   04-14    140  |  100  |  13  ----------------------------<  80  4.0   |  23  |  0.63    Ca    9.3      14 Apr 2018 16:22
ANESTHESIA POSTOP CHECK    38y Female POSTOP DAY 1     Vital Signs Last 24 Hrs  T(C): 36.7 (17 Apr 2018 09:54), Max: 37.3 (16 Apr 2018 19:30)  T(F): 98.1 (17 Apr 2018 09:54), Max: 99.1 (16 Apr 2018 19:30)  HR: 83 (17 Apr 2018 09:54) (60 - 84)  BP: 120/70 (17 Apr 2018 09:54) (103/65 - 123/76)  BP(mean): 72 (16 Apr 2018 22:00) (72 - 93)  RR: 18 (17 Apr 2018 09:54) (10 - 20)  SpO2: 96% (17 Apr 2018 09:54) (94% - 98%)  I&O's Summary    16 Apr 2018 07:01  -  17 Apr 2018 07:00  --------------------------------------------------------  IN: 1415 mL / OUT: 1975 mL / NET: -560 mL        [ x] NO APPARENT ANESTHESIA COMPLICATIONS      Comments:
HPI:  38 yr old female with hx of irritability presented with L arm pain and weakness since Sunday. Per patient she had some numbness and tingling in the L hand and arm which progressed on Thursday to include weakness in her L hand and isolated to the 1st 3 digits on her hand. She does report it has been hard to  objects and that since Thursday the weakness has worsened. She had an appointment with Dr Caballero on Monday per her PMD but came to the hospital for burning pain in the L arm and worsened numbness. (14 Apr 2018 15:06)    ICU Vital Signs Last 24 Hrs  T(C): 36.2 (18 Apr 2018 01:36), Max: 36.7 (17 Apr 2018 09:54)  T(F): 97.2 (18 Apr 2018 01:36), Max: 98.1 (17 Apr 2018 09:54)  HR: 64 (18 Apr 2018 01:36) (56 - 83)  BP: 109/62 (18 Apr 2018 01:36) (107/67 - 129/80)  BP(mean): --  ABP: --  ABP(mean): --  RR: 18 (18 Apr 2018 01:36) (17 - 18)  SpO2: 97% (18 Apr 2018 01:36) (96% - 100%)    Exam    Awake, alert, responsive conversant  Speech slight dysarthric , mild difficulty swallowing  Pupils = R, EOM intact  FC+ on all 4 ext, WONG with good strength  Rt side moves well, LUE mild weakness with numbness in digit 3and 4  Had grasp 5/5 on rt, 4/5 on lt  Surgical wound dry and intact HMV d/cd yesterday    Labs                      12.5   12.04 )-----------( 204      ( 16 Apr 2018 21:40 )             38.6     16 Apr 2018 21:40    138    |  102    |  9      ----------------------------<  128    4.0     |  23     |  0.57     Ca    8.6        16 Apr 2018 21:40    CAPILLARY BLOOD GLUCOSE
NEUROSURGERY    OVERNIGHT EVENTS    ICU Vital Signs Last 24 Hrs  T(C): 36.9 (15 Apr 2018 21:58), Max: 37.1 (15 Apr 2018 17:32)  T(F): 98.5 (15 Apr 2018 21:58), Max: 98.8 (15 Apr 2018 17:32)  HR: 95 (15 Apr 2018 21:58) (64 - 101)  BP: 96/55 (15 Apr 2018 21:58) (96/55 - 126/79)  BP(mean): --  ABP: --  ABP(mean): --  RR: 18 (15 Apr 2018 21:58) (16 - 18)  SpO2: 98% (15 Apr 2018 21:58) (97% - 99%)    Exam    Awake, alert, responsive  Pupils =R , EOM   FC+ on all 4 ext, WONG with good strength  Mild LUE weakness    Radiology  < from: MR Cervical Spine No Cont (04.14.18 @ 12:00) >  Impression: Degenerative changes as described above.    Left-sided disc herniation is seen at the C6-7 level.    < end of copied text >
Patient is a 38y old  Female who presents with a chief complaint of neck pain (14 Apr 2018 15:06)        SUBJECTIVE / OVERNIGHT EVENTS:  no acute events o/n  pt reports pain is controlled  denies cp/sob      MEDICATIONS  (STANDING):  dexamethasone  Injectable 4 milliGRAM(s) IV Push every 6 hours  docusate sodium 100 milliGRAM(s) Oral three times a day  escitalopram 10 milliGRAM(s) Oral daily  gabapentin 100 milliGRAM(s) Oral every 8 hours  lactobacillus acidophilus 1 Tablet(s) Oral daily  pantoprazole    Tablet 40 milliGRAM(s) Oral before breakfast  senna 2 Tablet(s) Oral at bedtime    MEDICATIONS  (PRN):  acetaminophen   Tablet. 650 milliGRAM(s) Oral every 6 hours PRN Mild Pain (1 - 3)  ALBUTerol    90 MICROgram(s) HFA Inhaler 2 Puff(s) Inhalation every 6 hours PRN Shortness of Breath and/or Wheezing  diazepam    Tablet 2 milliGRAM(s) Oral every 8 hours PRN spasm  HYDROmorphone  Injectable 1 milliGRAM(s) IV Push every 4 hours PRN Severe Pain (7 - 10)  oxyCODONE    IR 5 milliGRAM(s) Oral every 4 hours PRN Moderate Pain (4 - 6)      Vital Signs Last 24 Hrs  T(C): 36.7 (17 Apr 2018 09:54), Max: 37.3 (16 Apr 2018 19:30)  T(F): 98.1 (17 Apr 2018 09:54), Max: 99.1 (16 Apr 2018 19:30)  HR: 83 (17 Apr 2018 09:54) (60 - 84)  BP: 120/70 (17 Apr 2018 09:54) (103/65 - 123/76)  BP(mean): 72 (16 Apr 2018 22:00) (72 - 93)  RR: 18 (17 Apr 2018 09:54) (10 - 20)  SpO2: 96% (17 Apr 2018 09:54) (94% - 98%)  CAPILLARY BLOOD GLUCOSE        I&O's Summary    16 Apr 2018 07:01  -  17 Apr 2018 07:00  --------------------------------------------------------  IN: 1415 mL / OUT: 1975 mL / NET: -560 mL      PHYSICAL EXAM:  GENERAL: NAD, well-groomed, well-developed  HEAD:  Atraumatic, Normocephalic  EYES: EOMI, PERRLA, conjunctiva and sclera clear  ENMT: Moist mucous membranes  NECK: Supple, No JVD  RESPIRATORY: Clear to auscultation bilaterally; No rales, rhonchi, wheezing, or rubs  CARDIOVASCULAR: Regular rate and rhythm; No murmurs, rubs, or gallops  GASTROINTESTINAL: Soft, Nontender, Nondistended; Bowel sounds present  EXTREMITIES:  2+ Peripheral Pulses, No clubbing, cyanosis, or edema  NERVOUS SYSTEM:  Alert & Oriented X3; Moving all 4 extremities; No gross sensory deficits      LABS:                        12.5   12.04 )-----------( 204      ( 16 Apr 2018 21:40 )             38.6     04-16    138  |  102  |  9   ----------------------------<  128<H>  4.0   |  23  |  0.57    Ca    8.6      16 Apr 2018 21:40                RADIOLOGY & ADDITIONAL TESTS:    Imaging Personally Reviewed:  Consultant(s) Notes Reviewed:    Care Discussed with Consultants/Other Providers:
NEUROSURGERY    Postop Check POD#0    HPI:  38 yr old female with hx of irritability presented with L arm pain and weakness since Sunday. Per patient she had some numbness and tingling in the L hand and arm which progressed on Thursday to include weakness in her L hand and isolated to the 1st 3 digits on her hand. She does report it has been hard to  objects and that since Thursday the weakness has worsened. She had an appointment with Dr Caballero on Monday per her PMD but came to the hospital for burning pain in the L arm and worsened numbness. (14 Apr 2018 15:06)     Post op Dx: Cervical stenosis  Procedure;   Surgeon: Dr. Cortney Caballero    ICU Vital Signs Last 24 Hrs  T(C): 37.3 (16 Apr 2018 19:30), Max: 37.3 (16 Apr 2018 19:30)  T(F): 99.1 (16 Apr 2018 19:30), Max: 99.1 (16 Apr 2018 19:30)  HR: 75 (16 Apr 2018 21:00) (68 - 95)  BP: 115/81 (16 Apr 2018 21:00) (96/55 - 139/83)  BP(mean): 88 (16 Apr 2018 21:00) (88 - 93)  ABP: 112/66 (16 Apr 2018 21:00) (106/60 - 128/72)  ABP(mean): 86 (16 Apr 2018 21:00) (86 - 88)  RR: 16 (16 Apr 2018 21:00) (10 - 20)  SpO2: 95% (16 Apr 2018 21:00) (92% - 98%)    Exam    Awake, alert, responsive conversant  Speech slight dysarthric , mild difficulty swallowing  Pupils = R, EOM intact  FC+ on all 4 ext, WONG with good strength  Rt side moves well, LUE mild weakness with numbness in digit 3and 4  Had grasp 5/5 on rt, 4/5 on lt  Surgical wound dry and intact HMV anteriorly    CAPILLARY BLOOD GLUCOSE

## 2018-04-18 NOTE — PROGRESS NOTE ADULT - PROBLEM SELECTOR PLAN 1
Monitor and record output of HMV  Pain management  Antibiotics x 24 hours  Steroid therapy  CT of c- spine in next 24-48 hours
Await OR date
Await plan for surgical management  Continue decadron  Medical clearance
D/c Planning
s/p ACDF, POD #1  on Decadron as per NSx  c/w pain regimen - Dilaudid 1mg IV q4h prn severe pain and oxycodone 5 mg IR po q4h prn moderate pain

## 2018-04-18 NOTE — DISCHARGE NOTE ADULT - NS AS ACTIVITY OBS
Showering allowed/No Heavy lifting/straining/Bathing allowed/Walking-Indoors allowed/Walking-Outdoors allowed

## 2018-04-18 NOTE — DISCHARGE NOTE ADULT - PLAN OF CARE
s/p cervical fusion 1. Remove top surgical dressing on post operative day 3 until it was removed by the surgical team prior to your discharge. Incision should be left uncovered after day 3.   2. Begin showering with shampoo today. Avoid long soaks and do not sumberge incision in bathtub. Regular shower only and allow soap and water to run over the incision. Pat incision area dry with clean towel- do not scrub. Please shower regularly to ensure incision stays clean to avoid post operative infections.   3. Notify your surgeon if you notice increased redness, drainge or you notice incision area opening.   4. Return to ER immediatley for high fevers, severe headache, vomiting, lethargy or  weakness  5. Please call your neurosurgeon following discharge to make follow up appointment in 1 week after discharge unless otherwise specified.   6. Post operative pain medication are sent to [facility](unless otherwise specified)- Located in [facility] [employer] [facility]. All post operative prescrptions should be picked up before departing the hospital  7. Ambulate as tolerate. Continue with all "activities of daily living". Avoid strenuous activity or lifting more than 10 pounds until cleared for additional activity at your follow up appointment  8. Do not return to work or school until cleared by your neurosurgeon at your follow up visit unless specified to you during your hospital stay continue home meds

## 2018-04-18 NOTE — DISCHARGE NOTE ADULT - HOSPITAL COURSE
38 yr old female with hx of irritability presented with L arm pain and weakness since Sunday. Per patient she had some numbness and tingling in the L hand and arm which progressed on Thursday to include weakness in her L hand and isolated to the 1st 3 digits on her hand. She does report it has been hard to  objects and that since Thursday the weakness has worsened. She had an appointment with Dr Caballero on Monday per her PMD but came to the hospital for burning pain in the L arm and worsened numbness. MRI of the cervical spine showed  HNP at C6-7,  Patient went to the OR on 4/16/18 for an ACDF C6-7, she tolerated procedure well, is ambulating independently, tolerating diet and is stable for discharge.

## 2018-04-18 NOTE — DISCHARGE NOTE ADULT - CARE PLAN
Principal Discharge DX:	Herniated disc, cervical  Goal:	s/p cervical fusion  Assessment and plan of treatment:	1. Remove top surgical dressing on post operative day 3 until it was removed by the surgical team prior to your discharge. Incision should be left uncovered after day 3.   2. Begin showering with shampoo today. Avoid long soaks and do not sumberge incision in bathtub. Regular shower only and allow soap and water to run over the incision. Pat incision area dry with clean towel- do not scrub. Please shower regularly to ensure incision stays clean to avoid post operative infections.   3. Notify your surgeon if you notice increased redness, drainge or you notice incision area opening.   4. Return to ER immediatley for high fevers, severe headache, vomiting, lethargy or  weakness  5. Please call your neurosurgeon following discharge to make follow up appointment in 1 week after discharge unless otherwise specified.   6. Post operative pain medication are sent to [facility](unless otherwise specified)- Located in [facility]. All post operative prescrptions should be picked up before departing the hospital  7. Ambulate as tolerate. Continue with all "activities of daily living". Avoid strenuous activity or lifting more than 10 pounds until cleared for additional activity at your follow up appointment  8. Do not return to work or school until cleared by your neurosurgeon at your follow up visit unless specified to you during your hospital stay  Secondary Diagnosis:	Anxiety  Assessment and plan of treatment:	continue home meds

## 2018-04-18 NOTE — PROGRESS NOTE ADULT - PROBLEM SELECTOR PROBLEM 1
Herniated disc, cervical
Cervical spinal stenosis
Cervical spinal stenosis
Herniated disc, cervical
Herniated disc, cervical

## 2018-04-18 NOTE — PROGRESS NOTE ADULT - ASSESSMENT
37 YO female with cervical spinal stenosis, cervical disk herniation
39 yo F w/ "irritability", mild intermittent asthma p/w LUE weakness, fth C6-C7 disk herniation, s/p ACDF on 4/16
Lt sided HNP @ C6-7
S/P C6-7 ACDF for HNP
S/P C6-7 ACDF for HNP

## 2018-04-18 NOTE — DISCHARGE NOTE ADULT - PATIENT PORTAL LINK FT
You can access the [facility] Patient Portal, offered by [facility], by registering with the following website: [email or web address]

## 2018-04-23 ENCOUNTER — APPOINTMENT (OUTPATIENT)
Dept: NEUROSURGERY | Facility: CLINIC | Age: 38
End: 2018-04-23
Payer: COMMERCIAL

## 2018-04-23 VITALS
DIASTOLIC BLOOD PRESSURE: 79 MMHG | SYSTOLIC BLOOD PRESSURE: 118 MMHG | WEIGHT: 164 LBS | HEIGHT: 62 IN | BODY MASS INDEX: 30.18 KG/M2 | HEART RATE: 80 BPM

## 2018-04-23 DIAGNOSIS — J45.909 UNSPECIFIED ASTHMA, UNCOMPLICATED: ICD-10-CM

## 2018-04-23 DIAGNOSIS — M48.02 SPINAL STENOSIS, CERVICAL REGION: ICD-10-CM

## 2018-04-23 DIAGNOSIS — Z87.898 PERSONAL HISTORY OF OTHER SPECIFIED CONDITIONS: ICD-10-CM

## 2018-04-23 PROCEDURE — 99024 POSTOP FOLLOW-UP VISIT: CPT

## 2018-04-24 PROBLEM — J45.909 ASTHMA: Status: RESOLVED | Noted: 2018-04-24 | Resolved: 2018-04-24

## 2018-04-24 PROBLEM — Z87.898 HISTORY OF IRRITABILITY: Status: RESOLVED | Noted: 2018-04-24 | Resolved: 2018-04-24

## 2018-04-24 RX ORDER — OXYCODONE 5 MG/1
5 TABLET ORAL
Refills: 0 | Status: ACTIVE | COMMUNITY

## 2018-04-24 RX ORDER — DIAZEPAM 5 MG/1
TABLET ORAL
Refills: 0 | Status: ACTIVE | COMMUNITY

## 2018-04-24 RX ORDER — GABAPENTIN 100 MG/1
100 CAPSULE ORAL
Refills: 0 | Status: ACTIVE | COMMUNITY

## 2018-04-24 RX ORDER — ACETAMINOPHEN 325 MG/1
325 TABLET ORAL
Refills: 0 | Status: ACTIVE | COMMUNITY

## 2018-04-24 RX ORDER — ESCITALOPRAM OXALATE 10 MG/1
10 TABLET, FILM COATED ORAL
Refills: 0 | Status: ACTIVE | COMMUNITY

## 2018-04-28 ENCOUNTER — TRANSCRIPTION ENCOUNTER (OUTPATIENT)
Age: 38
End: 2018-04-28

## 2019-11-18 ENCOUNTER — TRANSCRIPTION ENCOUNTER (OUTPATIENT)
Age: 39
End: 2019-11-18

## 2020-10-01 NOTE — DISCHARGE NOTE ADULT - CAREGIVER PHONE NUMBER
Assessment agreeable to triage note. Pt with hx DVT c/o atraumatic pain at the left tibia. +Pedal pulses equal and strong. No loss of sensation.
[phone]

## 2020-11-28 ENCOUNTER — TRANSCRIPTION ENCOUNTER (OUTPATIENT)
Age: 40
End: 2020-11-28

## 2023-03-14 NOTE — DISCHARGE NOTE ADULT - NSCORESITESY/N_GEN_A_CORE_RD
Unclear what is causing your scalp pain and localized swelling.  Does not appear to be consistent with shingles or a skin infection at this time.  Continue alternating Tylenol and ibuprofen (taken with food).  Apply heat to the area.    Continue to keep a close eye on symptoms and return should you develop fevers, rash, skin changes, vomiting, or any other symptoms of concern.  
No
yes

## 2023-08-17 ENCOUNTER — TRANSCRIPTION ENCOUNTER (OUTPATIENT)
Age: 43
End: 2023-08-17

## 2024-06-28 ENCOUNTER — NON-APPOINTMENT (OUTPATIENT)
Age: 44
End: 2024-06-28

## 2024-07-23 ENCOUNTER — OFFICE (OUTPATIENT)
Dept: URBAN - METROPOLITAN AREA CLINIC 109 | Facility: CLINIC | Age: 44
Setting detail: OPHTHALMOLOGY
End: 2024-07-23
Payer: COMMERCIAL

## 2024-07-23 DIAGNOSIS — H52.7: ICD-10-CM

## 2024-07-23 DIAGNOSIS — H43.393: ICD-10-CM

## 2024-07-23 DIAGNOSIS — H52.13: ICD-10-CM

## 2024-07-23 PROCEDURE — 92015 DETERMINE REFRACTIVE STATE: CPT | Performed by: OPHTHALMOLOGY

## 2024-07-23 PROCEDURE — 92250 FUNDUS PHOTOGRAPHY W/I&R: CPT | Performed by: OPHTHALMOLOGY

## 2024-07-23 PROCEDURE — 92004 COMPRE OPH EXAM NEW PT 1/>: CPT | Performed by: OPHTHALMOLOGY

## 2024-07-23 ASSESSMENT — CONFRONTATIONAL VISUAL FIELD TEST (CVF)
OS_FINDINGS: FULL
OD_FINDINGS: FULL

## 2024-08-28 NOTE — OCCUPATIONAL THERAPY INITIAL EVALUATION ADULT - LEVEL OF INDEPENDENCE: DRESS UPPER BODY, OT EVAL
----- Message from Sunni Sinclair sent at 8/28/2024  3:03 PM CDT -----  Type:  Sooner Appointment Request    Patient is requesting a sooner appointment.  Patient declined first available appointment listed as well as another facility and provider .  Patient will not accept being placed on the waitlist and is requesting a message be sent to doctor.    Name of Caller: self    When is the first available appointment? N/A    Symptoms: neck pain/ ED follow up    Would the patient rather a call back or a response via [facility]? call    Best Call Back Number: ..[phone] (home)  
Patient scheduled an appointment for 8/30/2024 with Dr. Coelho.  
independent

## 2024-09-16 ASSESSMENT — REFRACTION_MANIFEST
OS_CYLINDER: -0.25
OS_SPHERE: -0.75
OS_AXIS: 128
OD_VA1: 20/20
OS_ADD: +2.00
OD_SPHERE: -0.75
OD_CYLINDER: -0.50
OS_VA1: 20/20
OD_ADD: +2.00
OD_AXIS: 28

## 2024-11-04 NOTE — PROGRESS NOTE ADULT - ATTENDING COMMENTS
Writer called patient and left detailed message that Dr Rob stated her CT scan look good. The nodule Dr. Blankenship examined is getting smaller and is consistent with it being inflammatory and not cancer. The other smaller nodules are stable as well. She should go back to having the lung cancer screening every year and follow up with Dr. Rob in 6 months.    DVT ppx - none needed, as young and ambulatory

## 2025-02-18 ENCOUNTER — OUTPATIENT (OUTPATIENT)
Dept: INPATIENT UNIT | Facility: HOSPITAL | Age: 45
LOS: 1 days | Discharge: HOME | End: 2025-02-18
Payer: COMMERCIAL

## 2025-02-18 ENCOUNTER — TRANSCRIPTION ENCOUNTER (OUTPATIENT)
Age: 45
End: 2025-02-18

## 2025-02-18 VITALS
OXYGEN SATURATION: 100 % | DIASTOLIC BLOOD PRESSURE: 76 MMHG | WEIGHT: 134.92 LBS | SYSTOLIC BLOOD PRESSURE: 117 MMHG | HEART RATE: 82 BPM | HEIGHT: 61 IN | TEMPERATURE: 98 F | RESPIRATION RATE: 15 BRPM

## 2025-02-18 VITALS
RESPIRATION RATE: 16 BRPM | OXYGEN SATURATION: 100 % | SYSTOLIC BLOOD PRESSURE: 116 MMHG | TEMPERATURE: 99 F | DIASTOLIC BLOOD PRESSURE: 67 MMHG | HEART RATE: 88 BPM

## 2025-02-18 DIAGNOSIS — Z98.891 HISTORY OF UTERINE SCAR FROM PREVIOUS SURGERY: Chronic | ICD-10-CM

## 2025-02-18 DIAGNOSIS — Z98.890 OTHER SPECIFIED POSTPROCEDURAL STATES: Chronic | ICD-10-CM

## 2025-02-18 DIAGNOSIS — N62 HYPERTROPHY OF BREAST: ICD-10-CM

## 2025-02-18 DIAGNOSIS — M54.6 PAIN IN THORACIC SPINE: ICD-10-CM

## 2025-02-18 LAB — HCG UR QL: NEGATIVE — SIGNIFICANT CHANGE UP

## 2025-02-18 PROCEDURE — C9399: CPT

## 2025-02-18 PROCEDURE — 88305 TISSUE EXAM BY PATHOLOGIST: CPT

## 2025-02-18 PROCEDURE — 88305 TISSUE EXAM BY PATHOLOGIST: CPT | Mod: 26

## 2025-02-18 PROCEDURE — 81025 URINE PREGNANCY TEST: CPT

## 2025-02-18 RX ORDER — PHENTERMINE HCL 37.5 MG
1 TABLET ORAL
Refills: 0 | DISCHARGE

## 2025-02-18 RX ORDER — OXYCODONE HYDROCHLORIDE 30 MG/1
5 TABLET ORAL ONCE
Refills: 0 | Status: DISCONTINUED | OUTPATIENT
Start: 2025-02-18 | End: 2025-02-18

## 2025-02-18 RX ORDER — OFLOXACIN 0.3 %
2 DROPS OPHTHALMIC (EYE) EVERY 6 HOURS
Refills: 0 | Status: DISCONTINUED | OUTPATIENT
Start: 2025-02-18 | End: 2025-02-18

## 2025-02-18 RX ORDER — TOPIRAMATE 25 MG/1
1 TABLET, FILM COATED ORAL
Refills: 0 | DISCHARGE

## 2025-02-18 RX ORDER — SODIUM CHLORIDE 9 G/ML
1000 INJECTION, SOLUTION INTRAVENOUS
Refills: 0 | Status: DISCONTINUED | OUTPATIENT
Start: 2025-02-18 | End: 2025-02-18

## 2025-02-18 RX ORDER — HYDROMORPHONE HYDROCHLORIDE 4 MG/ML
0.5 INJECTION, SOLUTION INTRAMUSCULAR; INTRAVENOUS; SUBCUTANEOUS
Refills: 0 | Status: DISCONTINUED | OUTPATIENT
Start: 2025-02-18 | End: 2025-02-18

## 2025-02-18 RX ORDER — FENTANYL CITRATE 50 UG/ML
50 INJECTION INTRAMUSCULAR; INTRAVENOUS
Refills: 0 | Status: DISCONTINUED | OUTPATIENT
Start: 2025-02-18 | End: 2025-02-18

## 2025-02-18 RX ORDER — ONDANSETRON 4 MG/1
4 TABLET, ORALLY DISINTEGRATING ORAL ONCE
Refills: 0 | Status: DISCONTINUED | OUTPATIENT
Start: 2025-02-18 | End: 2025-02-18

## 2025-02-18 RX ADMIN — OXYCODONE HYDROCHLORIDE 5 MILLIGRAM(S): 30 TABLET ORAL at 15:53

## 2025-02-18 RX ADMIN — HYDROMORPHONE HYDROCHLORIDE 0.5 MILLIGRAM(S): 4 INJECTION, SOLUTION INTRAMUSCULAR; INTRAVENOUS; SUBCUTANEOUS at 17:37

## 2025-02-18 RX ADMIN — HYDROMORPHONE HYDROCHLORIDE 0.5 MILLIGRAM(S): 4 INJECTION, SOLUTION INTRAMUSCULAR; INTRAVENOUS; SUBCUTANEOUS at 15:28

## 2025-02-18 RX ADMIN — SODIUM CHLORIDE 75 MILLILITER(S): 9 INJECTION, SOLUTION INTRAVENOUS at 15:56

## 2025-02-18 RX ADMIN — Medication 2 DROP(S): at 17:36

## 2025-02-18 NOTE — ASU PATIENT PROFILE, ADULT - AS SC BRADEN MOISTURE
----- Message from William Caruso MD sent at 8/28/2023  3:30 PM CDT -----  Mild chronic changes on brain MRI.   (4) rarely moist

## 2025-02-18 NOTE — ASU DISCHARGE PLAN (ADULT/PEDIATRIC) - NS MD DC FALL RISK RISK
For information on Fall & Injury Prevention, visit: [email or web address] OR  [email or web address] OR  https://www.cdc.gov/steadi/patient.html

## 2025-02-18 NOTE — ASU DISCHARGE PLAN (ADULT/PEDIATRIC) - ASU DC SPECIAL INSTRUCTIONSFT
Keep surgical bra on, you may remove 2 days after surgery to shower, pat dry incisions and reapply bra to wear at all times.   You may start antibiotics tonight with food.  Walk often to prevent blood clots.  You may alternate tylenol and motrin for pain control.   Please call [phone] for any questions.  Follow up in the office in 1 week. Keep surgical bra on, you may remove 2 days after surgery to shower, pat dry incisions and reapply bra to wear at all times.   Keep compression on axilla area, you will be sent home with 2 rolls of white gauze, you may apply for additional compression to that area if needed.   You may start antibiotics tonight with food.  Walk often to prevent blood clots.  You may alternate tylenol and motrin for pain control.   Please call [phone] for any questions.  Follow up in the office in 1 week.

## 2025-02-18 NOTE — ASU DISCHARGE PLAN (ADULT/PEDIATRIC) - FINANCIAL ASSISTANCE
[facility] provides services at a reduced cost to those who are determined to be eligible through [facility]’s financial assistance program. Information regarding [facility]’s financial assistance program can be found by going to [email or web address] or by calling [phone].

## 2025-02-18 NOTE — ASU PREOP CHECKLIST - INTERNAL PROSTHESES
Relieving Back Pain  Back pain is a common problem. You can strain back muscles by lifting too much weight or just by moving the wrong way. Back strain can be uncomfortable, even painful. And it can take weeks or months to improve.  To help yourself feel Take care of your back throughout the day. You will likely have fewer back problems if you do. Try to warm up before you move. Shift positions often. Also do your best to form healthy habits.   Warm up for the day  Do a few slow, catlike stretches before st no

## 2025-02-21 DIAGNOSIS — N62 HYPERTROPHY OF BREAST: ICD-10-CM

## 2025-02-21 DIAGNOSIS — N60.12 DIFFUSE CYSTIC MASTOPATHY OF LEFT BREAST: ICD-10-CM

## 2025-02-21 DIAGNOSIS — M54.6 PAIN IN THORACIC SPINE: ICD-10-CM

## 2025-02-21 DIAGNOSIS — J45.909 UNSPECIFIED ASTHMA, UNCOMPLICATED: ICD-10-CM

## 2025-02-21 DIAGNOSIS — N60.22 FIBROADENOSIS OF LEFT BREAST: ICD-10-CM

## 2025-02-21 DIAGNOSIS — N60.11 DIFFUSE CYSTIC MASTOPATHY OF RIGHT BREAST: ICD-10-CM

## 2025-02-21 DIAGNOSIS — N60.21 FIBROADENOSIS OF RIGHT BREAST: ICD-10-CM

## 2025-02-21 LAB — SURGICAL PATHOLOGY STUDY: SIGNIFICANT CHANGE UP
